# Patient Record
Sex: FEMALE | Race: WHITE | NOT HISPANIC OR LATINO | Employment: OTHER | URBAN - METROPOLITAN AREA
[De-identification: names, ages, dates, MRNs, and addresses within clinical notes are randomized per-mention and may not be internally consistent; named-entity substitution may affect disease eponyms.]

---

## 2018-03-27 DIAGNOSIS — J45.20 MILD INTERMITTENT EXTRINSIC ASTHMA: Primary | ICD-10-CM

## 2018-05-09 ENCOUNTER — OFFICE VISIT (OUTPATIENT)
Dept: NEUROLOGY | Facility: HOSPITAL | Age: 38
End: 2018-05-09
Attending: INTERNAL MEDICINE
Payer: COMMERCIAL

## 2018-05-09 VITALS
TEMPERATURE: 98 F | HEART RATE: 78 BPM | DIASTOLIC BLOOD PRESSURE: 77 MMHG | BODY MASS INDEX: 20.49 KG/M2 | SYSTOLIC BLOOD PRESSURE: 118 MMHG | WEIGHT: 138.31 LBS | HEIGHT: 69 IN

## 2018-05-09 DIAGNOSIS — K59.00 CONSTIPATION, UNSPECIFIED CONSTIPATION TYPE: Primary | ICD-10-CM

## 2018-05-09 PROCEDURE — 99213 OFFICE O/P EST LOW 20 MIN: CPT | Performed by: INTERNAL MEDICINE

## 2018-05-09 RX ORDER — ERGOCALCIFEROL 1.25 MG/1
CAPSULE ORAL
Refills: 3 | COMMUNITY
Start: 2018-03-24 | End: 2018-10-26

## 2018-05-09 RX ORDER — BUTALBITAL, ACETAMINOPHEN AND CAFFEINE 300; 40; 50 MG/1; MG/1; MG/1
CAPSULE ORAL
Refills: 3 | COMMUNITY
Start: 2018-04-10 | End: 2018-05-09 | Stop reason: SDUPTHER

## 2018-05-09 RX ORDER — BUTALBITAL, ACETAMINOPHEN AND CAFFEINE 50; 325; 40 MG/1; MG/1; MG/1
CAPSULE ORAL
Refills: 2 | COMMUNITY
Start: 2018-02-19 | End: 2018-10-26

## 2018-05-09 RX ORDER — ALBUTEROL SULFATE 0.83 MG/ML
SOLUTION RESPIRATORY (INHALATION)
Refills: 6 | COMMUNITY
Start: 2018-03-26 | End: 2018-10-26

## 2018-05-09 NOTE — PROGRESS NOTES
[facility]    CC: constipation    HPI 38 y.o. female with history of migraine and tension headaches who presents with complaints of over a decade of chronic, persistent, constipation associated with bloating without alarm features of rectal bleeding or weight loss. She states that she the symptoms of constipation have been ongoing even before she gave birth to her children starting at age 32. She has never had colonoscopy. She does take senna nightly to have a bowel movement every day. If she does not have a bowel movement then she suffers from bloating. She does run marathons, but does not endorse drinking protein shakes or whey protein. She has changed her diet to mainly plant based foods which has helped with bloating. Denies rectal bleeding. She has bowel movements daily, with the use of senna and stimulant laxatives like dulcolax. Miralax has not helped in the past. She has not tried prescription medications recently.     PMH  Migraine and tension headaches    PSH  Right wrise ORIF after fall    Social History  No smoking or drug use  Social alcohol use    FH  Grandfather with colon cancer age 60  Aunt with breast cancer  No first degree family members with colon cancer    Review of Systems  General ROS: negative for chills, fever or weight loss  Psychological ROS: negative for hallucination, depression or suicidal ideation  Ophthalmic ROS: negative for blurry vision, photophobia or eye pain  ENT ROS: negative for epistaxis, sore throat or rhinorrhea  Respiratory ROS: no cough, shortness of breath, or wheezing  Cardiovascular ROS: no chest pain or dyspnea on exertion  Gastrointestinal ROS: +bloating no abdominal pain, change in bowel habits, or black/ bloody stools  Genito-Urinary ROS: no dysuria, trouble voiding, or hematuria  Musculoskeletal ROS: negative for gait disturbance or muscular weakness  Neurological ROS: no syncope or seizures; no ataxia  Dermatological ROS: negative for pruritis, rash  "and jaundice    Physical Examination  /77   Pulse 78   Temp 97.7 °F (36.5 °C) (Oral)   Ht 5' 9" (1.753 m)   Wt 62.8 kg (138 lb 5.4 oz)   BMI 20.43 kg/m²   General appearance: alert, cooperative, no distress  HENT: Normocephalic, atraumatic, neck symmetrical, no nasal discharge   Eyes: conjunctivae/corneas clear, PERRL, EOM's intact  Lungs: clear to auscultation bilaterally, no dullness to percussion bilaterally  Heart: regular rate and rhythm without rub; no displacement of the PMI   Abdomen: soft, non-tender; bowel sounds normoactive; no organomegaly  Extremities: extremities symmetric; no clubbing, cyanosis, or edema  Integument: Skin color, texture, turgor normal; no rashes; hair distrubution normal  Neurologic: Alert and oriented X 3, normal strength, normal coordination and gait  Psychiatric: no pressured speech; normal affect; no evidence of impaired cognition     Assessment:   History of chronic constipation with bloating despite use of over the counter stimulant laxatives and miralax. One of her primary concerns is protuberance of her abdomen at night and when wearing a swim suit. This symptom of bloating/protuberance is worse since having her third child ~18 months ago. She reports her stomach is more flat when she is fasting. She has some improvement in her bloating after a BM. She consumes primarily a vegetarian diet but does not inject unabsorbed sugars or fiber supplements. She has been tested for celiac disease by serology but this was on a gluten free diet due to gluten intolerance. 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

## 2018-05-18 ENCOUNTER — TELEPHONE (OUTPATIENT)
Dept: NEUROLOGY | Facility: HOSPITAL | Age: 38
End: 2018-05-18

## 2018-05-18 NOTE — TELEPHONE ENCOUNTER
----- Message from Sarai English sent at 5/18/2018  8:38 AM CDT -----  Contact: Patient  GI- Patient was instructed to call and give an update of her condition. Patient states the constipation is getting a little better as of a couple of days ago. Patient still has very bad bloating. She feels something is not right. Abdomen is very distended. Peppermint is giving her a little reflux. Patient has not started on the antibiotic yet. Patient wants to know if she should go to her gynocologist.  Call back patient at  [phone]

## 2018-05-18 NOTE — TELEPHONE ENCOUNTER
Pt taking Linzess since 5/9/18, started IBGard on 5/16/18.  Pt having diarrhea now with bloating.  Bloating for about 9 months.  Pt is having regular periods.  Pt would like to know what is the next step.  Pt to be notified with Dr. Marquez recommendations.

## 2018-05-21 ENCOUNTER — TELEPHONE (OUTPATIENT)
Dept: NEUROLOGY | Facility: HOSPITAL | Age: 38
End: 2018-05-21

## 2018-05-21 NOTE — TELEPHONE ENCOUNTER
Returned pt's call, pt spoke to Dr. Marquez, this am.  Linzess and Xifaxan to be sent to pharmacy by Dr. Marquez.

## 2018-05-21 NOTE — PROGRESS NOTES
Called patient to review symptoms. She is having one BM a day on Linzess and this may be an alternative to Senna. Her bloating is not significantly improved with Linzess or IBGard.   Recommend:   Continue Linzess 290mcg daily as an alternative to routine Senna   Stop IBgard and proceed with trial of xifaxan   Follow up in clinic if no improvement with xifaxan   Prescription for Linzess sent to pharmacy

## 2018-05-24 ENCOUNTER — TELEPHONE (OUTPATIENT)
Dept: NEUROLOGY | Facility: HOSPITAL | Age: 38
End: 2018-05-24

## 2018-05-24 NOTE — TELEPHONE ENCOUNTER
----- Message from Sarai English sent at 5/24/2018 10:17 AM CDT -----  Contact: Patient  GI- Patient needs to speak to a nurse to give report about medications she has been taking and how she has been doing.  Call back [phone]

## 2018-05-24 NOTE — TELEPHONE ENCOUNTER
Pt continues with bloating, discomfort, bowel movements only with use of Glycerin suppository.  Pt wants to know when should she notice a change.  Pt to be notified with Dr. Marquez recommendations.

## 2018-05-25 NOTE — TELEPHONE ENCOUNTER
Pt instructed to complete 10 day trial of Xifaxan and continue with Linzess.  Pt to come back to clinic if not better in a couple of weeks.  Pt going out of town in a week, states she will call clinic or PCP for appt before going out of town.

## 2018-10-26 ENCOUNTER — OFFICE VISIT (OUTPATIENT)
Dept: OBSTETRICS AND GYNECOLOGY | Facility: CLINIC | Age: 38
End: 2018-10-26
Attending: OBSTETRICS & GYNECOLOGY
Payer: COMMERCIAL

## 2018-10-26 VITALS — HEIGHT: 69 IN | WEIGHT: 138.88 LBS | BODY MASS INDEX: 20.57 KG/M2

## 2018-10-26 DIAGNOSIS — N92.0 MENORRHAGIA WITH REGULAR CYCLE: Primary | ICD-10-CM

## 2018-10-26 PROCEDURE — 99999 PR PBB SHADOW E&M-EST. PATIENT-LVL III: CPT | Mod: PBBFAC,,, | Performed by: OBSTETRICS & GYNECOLOGY

## 2018-10-26 PROCEDURE — 99203 OFFICE O/P NEW LOW 30 MIN: CPT | Mod: S$GLB,,, | Performed by: OBSTETRICS & GYNECOLOGY

## 2018-10-26 RX ORDER — BUTALBITAL, ACETAMINOPHEN AND CAFFEINE 300; 40; 50 MG/1; MG/1; MG/1
CAPSULE ORAL
Refills: 1 | COMMUNITY
Start: 2018-10-16

## 2018-10-26 RX ORDER — SPIRONOLACTONE 50 MG/1
TABLET, FILM COATED ORAL
Refills: 0 | COMMUNITY
Start: 2018-08-17 | End: 2019-09-09

## 2018-10-26 NOTE — PROGRESS NOTES
"CC: establish care/heavy menses    Glo Aleman is a 38 y.o. female  moved to [facility] one year ago needs gyn.  She is not established.  Last annual/pap was 2017 and declines annual today.      Previous cycles q28 days back in , since having children hasn't had many cycles due to breastfeeding. Stopped breastfeeding this past 2018 and since then her cycles have been heavy and frequent.    Cycles q 18 days/7 days  Heavy, tampons and pads, 8 hours in changes q 1-2 hours.  3 days very heavy.  Had pcp was anemic, now on iron and not anemic, had tsh checked and declines meds today.  Sex once a month, coitus interruptus.  Marriage is strained.  LMP Oct 16.      Past Medical History:   Diagnosis Date    Abnormal Pap smear of cervix     hpv    Asthma     IBS (irritable bowel syndrome)     Migraine        Past Surgical History:   Procedure Laterality Date    WRIST SURGERY  2017    right wrist       OB History    Para Term  AB Living   3 3 3     3   SAB TAB Ectopic Multiple Live Births           3      # Outcome Date GA Lbr Cyril/2nd Weight Sex Delivery Anes PTL Lv   3 Term  41w0d  3.629 kg (8 lb) M Vag-Spont   VINICIUS   2 Term  39w0d  3.175 kg (7 lb) F Vag-Spont   VINICIUS   1 Term  39w0d  3.147 kg (6 lb 15 oz) F Vag-Spont   VINICIUS          Family History   Problem Relation Age of Onset    Alzheimer's disease Father     Hypertension Father     Asthma Mother     Breast cancer Maternal Aunt        Social History     Tobacco Use    Smoking status: Never Smoker    Smokeless tobacco: Never Used   Substance Use Topics    Alcohol use: No     Frequency: Never    Drug use: No       Ht 5' 9" (1.753 m)   Wt 63 kg (138 lb 14.2 oz)   LMP 10/16/2018   BMI 20.51 kg/m²     ROS:  GENERAL: Denies weight gain or weight loss. Feeling well overall.   SKIN: Denies rash or lesions.   HEAD: Denies head injury or headache.   NODES: Denies enlarged lymph nodes.   CHEST: Denies chest pain or shortness of " breath.   CARDIOVASCULAR: Denies palpitations or left sided chest pain.   ABDOMEN: No abdominal pain, constipation, diarrhea, nausea, vomiting or rectal bleeding.   URINARY: No frequency, dysuria, hematuria, or burning on urination.  REPRODUCTIVE: See HPI.   BREASTS: denies pain, lumps, or nipple discharge.   HEMATOLOGIC: No easy bruisability or excessive bleeding.  MUSCULOSKELETAL: Denies joint pain or swelling.   NEUROLOGIC: Denies syncope or weakness.   PSYCHIATRIC: Denies depression, anxiety or mood swings.    Physical Exam:    APPEARANCE: Well nourished, well developed, in no acute distress.  AFFECT: WNL, alert and oriented x 3  SKIN: No acne or hirsutism  NECK: Neck symmetric without masses or thyromegaly  NODES: No inguinal, cervical, axillary, or femoral lymph node enlargement  CHEST: Good respiratory effect  ABDOMEN: Soft.  No tenderness or masses.  No hepatosplenomegaly.  No hernias.  EXTREMITIES: No edema.    ASSESSMENT AND PLAN    Glo was seen today for new pt..    Diagnoses and all orders for this visit:    Menorrhagia with regular cycle    Other orders  -     levonorgestrel (MIRENA) 20 mcg/24 hr (5 years) IUD; 1 Intra Uterine Device by Intrauterine route once. for 1 dose    Detailed discussed on tx options with patient.  Does not want ocps.  Lysteda, prometrium nightly or cyclic or mirena IUD.  She knows she should not get pregnant on aldactone and I recommend more reliable contraception.  Mirena IUD is only local hormone effect, contraception and will best treat her bleeding.  She desires mirena.      Follow-up in about 4 weeks (around 11/23/2018).

## 2018-11-30 ENCOUNTER — TELEPHONE (OUTPATIENT)
Dept: PHARMACY | Facility: CLINIC | Age: 38
End: 2018-11-30

## 2018-11-30 NOTE — TELEPHONE ENCOUNTER
Contacted patient to notify her Mirena is covered on her pharmacy benefit with a $0.00 copay. She states she would like to wait until after the holidays to proceed. Will notify her provider and follow up after the first of the year.

## 2019-01-08 ENCOUNTER — TELEPHONE (OUTPATIENT)
Dept: PHARMACY | Facility: CLINIC | Age: 39
End: 2019-01-08

## 2019-07-09 ENCOUNTER — TELEPHONE (OUTPATIENT)
Dept: OBSTETRICS AND GYNECOLOGY | Facility: CLINIC | Age: 39
End: 2019-07-09

## 2019-07-09 NOTE — TELEPHONE ENCOUNTER
Spoke to pt. Pt was scheduled for an appt with Dr Osborne on Tuesday 09/10/19 at 1:15pm. Pt will call back when she finds out if her insurance is accepted.

## 2019-07-09 NOTE — TELEPHONE ENCOUNTER
----- Message from Marilee Dalton sent at 7/9/2019  9:41 AM CDT -----  Contact: self  Patient is calling to schedule a new patient appt for hormones. Patient can be reached at [phone].

## 2019-09-03 ENCOUNTER — TELEPHONE (OUTPATIENT)
Dept: OBSTETRICS AND GYNECOLOGY | Facility: CLINIC | Age: 39
End: 2019-09-03

## 2019-09-03 NOTE — TELEPHONE ENCOUNTER
Tanvir pt- saw Dr. Ramey in October of 2018. Has had irregular periods since the birth of her kids, and it was suggested at that visit that maybe Mirena would be a good option to think about.   Went to her PCP for annual visit and has not been feeling good. Reports hair loss (extreme), and being cold all the time.   Labs done at [email or web address] pt states  LH- norm  fsh-norm  Estradiol 29   Tsh- 4.14   She has faxed the labs over to "[employer] today and can pull up on phone. Wants to know if Mirena would be sufficient enough to raise that estrogen level?   Scheduled to discuss on 9/9

## 2019-09-03 NOTE — TELEPHONE ENCOUNTER
Wax pt - pt has previously discussed getting a mirena inserted with  at her last appt. She has recently found out that her estrogen level is low and would like to see if the mirena would still be a good option or if she should consider something else.

## 2019-09-09 ENCOUNTER — OFFICE VISIT (OUTPATIENT)
Dept: OBSTETRICS AND GYNECOLOGY | Facility: CLINIC | Age: 39
End: 2019-09-09
Attending: OBSTETRICS & GYNECOLOGY
Payer: COMMERCIAL

## 2019-09-09 VITALS
BODY MASS INDEX: 20.24 KG/M2 | WEIGHT: 136.69 LBS | HEIGHT: 69 IN | DIASTOLIC BLOOD PRESSURE: 60 MMHG | SYSTOLIC BLOOD PRESSURE: 100 MMHG

## 2019-09-09 DIAGNOSIS — N91.5 OLIGOMENORRHEA, UNSPECIFIED TYPE: Primary | ICD-10-CM

## 2019-09-09 DIAGNOSIS — N95.1 PERIMENOPAUSE: ICD-10-CM

## 2019-09-09 PROCEDURE — 99999 PR PBB SHADOW E&M-EST. PATIENT-LVL III: CPT | Mod: PBBFAC,,, | Performed by: OBSTETRICS & GYNECOLOGY

## 2019-09-09 PROCEDURE — 99213 OFFICE O/P EST LOW 20 MIN: CPT | Mod: S$GLB,,, | Performed by: OBSTETRICS & GYNECOLOGY

## 2019-09-09 PROCEDURE — 99213 PR OFFICE/OUTPT VISIT, EST, LEVL III, 20-29 MIN: ICD-10-PCS | Mod: S$GLB,,, | Performed by: OBSTETRICS & GYNECOLOGY

## 2019-09-09 PROCEDURE — 99999 PR PBB SHADOW E&M-EST. PATIENT-LVL III: ICD-10-PCS | Mod: PBBFAC,,, | Performed by: OBSTETRICS & GYNECOLOGY

## 2019-09-09 RX ORDER — PROGESTERONE 100 MG/1
100 CAPSULE ORAL NIGHTLY
Qty: 90 CAPSULE | Refills: 3 | Status: SHIPPED | OUTPATIENT
Start: 2019-09-09 | End: 2020-09-01

## 2019-09-09 RX ORDER — ALBUTEROL SULFATE 0.83 MG/ML
2.5 SOLUTION RESPIRATORY (INHALATION)
COMMUNITY
Start: 2018-03-26 | End: 2022-06-01

## 2019-09-09 RX ORDER — SPIRONOLACTONE 100 MG/1
TABLET, FILM COATED ORAL
Refills: 3 | COMMUNITY
Start: 2019-08-20

## 2019-09-09 RX ORDER — BUTALBITAL, ACETAMINOPHEN AND CAFFEINE 300; 40; 50 MG/1; MG/1; MG/1
50 CAPSULE ORAL
COMMUNITY
Start: 2018-04-10 | End: 2019-09-09

## 2019-09-09 RX ORDER — SPIRONOLACTONE 50 MG/1
50 TABLET, FILM COATED ORAL
COMMUNITY
Start: 2018-05-15 | End: 2019-09-09

## 2019-09-09 RX ORDER — ERGOCALCIFEROL 1.25 MG/1
50000 CAPSULE ORAL
COMMUNITY
Start: 2018-03-24 | End: 2022-05-16

## 2019-09-09 NOTE — PROGRESS NOTES
CC:  Skipping her cycles    Glo Aleman is a 39 y.o. female  I last saw the patient in 2018.  Back then she was having poly menorrhea with cycles every 18 days she also needed contraception and we had discussed Mirena IUD.  She never followed up and decided against the Mirena IUD.  She feels like she is having hormone problems and took a cash pay hormone tests that told her she had low estradiol.  She also had outside labs drawn 1 week after the start of her.    Her cycles this past year have been very spaced.  She has had 4 periods this year.  Lasting 5-7 days.  Some more heavy summer not.  She denies hot flashes.  She has some vaginal dryness.  She is extremely cold and sleeves with a heated blanket.  She reports hair loss.  She is currently sexually active with her  but is not in a good relationship and it is infrequent.  These coitus interruptus for contraception.  Her last period was 2 months ago.  She took a pregnancy test 1 month ago and it was negative. She declines a pregnancy test today.    TSH was 4.14 last year was 2.02.  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    Took everyly well test on was told low estradiol.      Past Medical History:   Diagnosis Date    Abnormal Pap smear of cervix     hpv    Asthma     IBS (irritable bowel syndrome)     Migraine        Past Surgical History:   Procedure Laterality Date    WRIST SURGERY  2017    right wrist       OB History    Para Term  AB Living   3 3 3     3   SAB TAB Ectopic Multiple Live Births           3      # Outcome Date GA Lbr Cyril/2nd Weight Sex Delivery Anes PTL Lv   3 Term  41w0d  3.629 kg (8 lb) M Vag-Spont   VINICIUS   2 Term  39w0d  3.175 kg (7 lb) F Vag-Spont   VINICIUS   1 Term  39w0d  3.147 kg (6 lb 15 oz) F Vag-Spont   VINICIUS       Family History   Problem Relation Age of Onset    Alzheimer's disease Father     Hypertension Father      "Asthma Mother     Breast cancer Maternal Aunt        Social History     Tobacco Use    Smoking status: Never Smoker    Smokeless tobacco: Never Used   Substance Use Topics    Alcohol use: No     Frequency: Never    Drug use: No       /60   Ht 5' 9" (1.753 m)   Wt 62 kg (136 lb 11 oz)   LMP 08/17/2019   BMI 20.18 kg/m²     ROS:  GENERAL: Denies weight gain or weight loss. Feeling well overall.   SKIN: Denies rash or lesions.   HEAD: Denies head injury or headache.   NODES: Denies enlarged lymph nodes.   CHEST: Denies chest pain or shortness of breath.   CARDIOVASCULAR: Denies palpitations or left sided chest pain.   ABDOMEN: No abdominal pain, constipation, diarrhea, nausea, vomiting or rectal bleeding.   URINARY: No frequency, dysuria, hematuria, or burning on urination.  REPRODUCTIVE: See HPI.   BREASTS: denies pain, lumps, or nipple discharge.   HEMATOLOGIC: No easy bruisability or excessive bleeding.  MUSCULOSKELETAL: Denies joint pain or swelling.   NEUROLOGIC: Denies syncope or weakness.   PSYCHIATRIC: Denies depression, anxiety or mood swings.    Physical Exam:    APPEARANCE: Well nourished, well developed, in no acute distress.  AFFECT: WNL, alert and oriented x 3  SKIN: No acne or hirsutism  NECK: Neck symmetric without masses or thyromegaly  NODES: No inguinal, cervical, axillary, or femoral lymph node enlargement  CHEST: Good respiratory effect  ABDOMEN: Soft.  No tenderness or masses.  No hepatosplenomegaly.  No hernias.  EXTREMITIES: No edema.    ASSESSMENT AND PLAN    Glo was seen today for consult.    Diagnoses and all orders for this visit:    Oligomenorrhea, unspecified type    Perimenopause  -     progesterone (PROMETRIUM) 100 MG capsule; Take 1 capsule (100 mg total) by mouth nightly. Increase to 200mg nightly after one week     Discussed with patient her labs are not conclusive for menopause however she could be perimenopausal.  I would draw an additional free T3 and free T4 but " I would also get an AMH level is that might give us more information.  I discussed with her not supplementing with estradiol as she is not menopausal.  Given her estradiol will only worsen her symptoms.  I recommend we start progesterone only.  We discussed about the side effects.  I also want her to see a hair loss specialist Dr. Rosey Patel.  Patient is going to call me to tell me where she can get her labs drawn.    Follow-up in 4-6 weeks

## 2019-09-10 ENCOUNTER — TELEPHONE (OUTPATIENT)
Dept: OBSTETRICS AND GYNECOLOGY | Facility: CLINIC | Age: 39
End: 2019-09-10

## 2019-09-10 DIAGNOSIS — N95.1 PERIMENOPAUSE: ICD-10-CM

## 2019-09-10 DIAGNOSIS — R53.83 FATIGUE, UNSPECIFIED TYPE: Primary | ICD-10-CM

## 2019-09-10 NOTE — TELEPHONE ENCOUNTER
Please review message, pt. Did not sleep well last night, asking if it could have been the medication you prescribed.    Pt. Had labs at [facility] or did you want to order labs for her to have a [facility]  (?)

## 2019-09-10 NOTE — TELEPHONE ENCOUNTER
Patient saw  yesterday and said she can her labs done at [facility] and she took her first progesterone last night and she didn't sleep at all last night, would the pill have prevented her from sleeping already?

## 2019-09-10 NOTE — TELEPHONE ENCOUNTER
Yes get AMH, free T3 and free T4.  Dx is fatigue and perimenopause.  Tell her to try meds again tonight if doesn't like it then stop

## 2019-10-21 LAB
MIS SERPL-MCNC: 0.05 NG/ML
T3FREE SERPL-MCNC: 3.1 PG/ML (ref 2–4.4)
T4 FREE SERPL-MCNC: 1.08 NG/DL (ref 0.82–1.77)

## 2019-10-22 DIAGNOSIS — E07.9 THYROID DISEASE: Primary | ICD-10-CM

## 2019-10-22 RX ORDER — THYROID 30 MG/1
30 TABLET ORAL
Qty: 30 TABLET | Refills: 11 | Status: SHIPPED | OUTPATIENT
Start: 2019-10-22 | End: 2020-11-16

## 2020-05-26 ENCOUNTER — TELEPHONE (OUTPATIENT)
Dept: OBSTETRICS AND GYNECOLOGY | Facility: CLINIC | Age: 40
End: 2020-05-26

## 2020-05-26 DIAGNOSIS — N92.6 ABNORMAL MENSTRUAL PERIODS: Primary | ICD-10-CM

## 2020-05-27 NOTE — TELEPHONE ENCOUNTER
Pt states she has had irregular periods for about 2 years. At first she was having long cycles but recently started having a period every 2 weeks and has back discomfort/pain.  She also reports hair loss.  Scheduled US and appt with Dr. Ramey 6/17.  Aware of US prep

## 2020-09-01 ENCOUNTER — OFFICE VISIT (OUTPATIENT)
Dept: OBSTETRICS AND GYNECOLOGY | Facility: CLINIC | Age: 40
End: 2020-09-01
Attending: OBSTETRICS & GYNECOLOGY
Payer: COMMERCIAL

## 2020-09-01 ENCOUNTER — LAB VISIT (OUTPATIENT)
Dept: LAB | Facility: HOSPITAL | Age: 40
End: 2020-09-01
Attending: OBSTETRICS & GYNECOLOGY
Payer: COMMERCIAL

## 2020-09-01 VITALS
DIASTOLIC BLOOD PRESSURE: 60 MMHG | SYSTOLIC BLOOD PRESSURE: 100 MMHG | HEIGHT: 69 IN | BODY MASS INDEX: 20.11 KG/M2 | WEIGHT: 135.81 LBS

## 2020-09-01 DIAGNOSIS — E07.9 THYROID DISEASE: ICD-10-CM

## 2020-09-01 DIAGNOSIS — N93.9 ABNORMAL UTERINE BLEEDING (AUB): ICD-10-CM

## 2020-09-01 DIAGNOSIS — Z12.4 PAP SMEAR FOR CERVICAL CANCER SCREENING: ICD-10-CM

## 2020-09-01 DIAGNOSIS — Z11.51 ENCOUNTER FOR SCREENING FOR HUMAN PAPILLOMAVIRUS (HPV): ICD-10-CM

## 2020-09-01 DIAGNOSIS — Z01.419 ENCOUNTER FOR GYNECOLOGICAL EXAMINATION: Primary | ICD-10-CM

## 2020-09-01 DIAGNOSIS — Z12.31 OTHER SCREENING MAMMOGRAM: ICD-10-CM

## 2020-09-01 PROCEDURE — 84439 ASSAY OF FREE THYROXINE: CPT

## 2020-09-01 PROCEDURE — 88175 CYTOPATH C/V AUTO FLUID REDO: CPT

## 2020-09-01 PROCEDURE — 99396 PR PREVENTIVE VISIT,EST,40-64: ICD-10-PCS | Mod: S$GLB,,, | Performed by: OBSTETRICS & GYNECOLOGY

## 2020-09-01 PROCEDURE — 99999 PR PBB SHADOW E&M-EST. PATIENT-LVL III: CPT | Mod: PBBFAC,,, | Performed by: OBSTETRICS & GYNECOLOGY

## 2020-09-01 PROCEDURE — 99396 PREV VISIT EST AGE 40-64: CPT | Mod: S$GLB,,, | Performed by: OBSTETRICS & GYNECOLOGY

## 2020-09-01 PROCEDURE — 84443 ASSAY THYROID STIM HORMONE: CPT

## 2020-09-01 PROCEDURE — 87624 HPV HI-RISK TYP POOLED RSLT: CPT

## 2020-09-01 PROCEDURE — 84481 FREE ASSAY (FT-3): CPT

## 2020-09-01 PROCEDURE — 99999 PR PBB SHADOW E&M-EST. PATIENT-LVL III: ICD-10-PCS | Mod: PBBFAC,,, | Performed by: OBSTETRICS & GYNECOLOGY

## 2020-09-01 NOTE — PROGRESS NOTES
"CC: Well woman exam    Glo Aleman is a 40 y.o. female  presents for a well woman exam.      Has h/o oligomenorrhea, but this past year bleeding eery 2 weeks and now LMP July.      Past Medical History:   Diagnosis Date    Abnormal Pap smear of cervix     hpv    Asthma     IBS (irritable bowel syndrome)     Migraine        Past Surgical History:   Procedure Laterality Date    WRIST SURGERY  2017    right wrist       OB History    Para Term  AB Living   3 3 3     3   SAB TAB Ectopic Multiple Live Births           3      # Outcome Date GA Lbr Cyril/2nd Weight Sex Delivery Anes PTL Lv   3 Term  41w0d  3.629 kg (8 lb) M Vag-Spont   VINICIUS   2 Term  39w0d  3.175 kg (7 lb) F Vag-Spont   VINICIUS   1 Term  39w0d  3.147 kg (6 lb 15 oz) F Vag-Spont   VINICIUS       Family History   Problem Relation Age of Onset    Alzheimer's disease Father     Hypertension Father     Asthma Mother     Breast cancer Maternal Aunt        Social History     Tobacco Use    Smoking status: Never Smoker    Smokeless tobacco: Never Used   Substance Use Topics    Alcohol use: No     Frequency: Never    Drug use: No       /60   Ht 5' 9" (1.753 m)   Wt 61.6 kg (135 lb 12.9 oz)   LMP 2020   BMI 20.05 kg/m²     ROS:  GENERAL: Denies weight gain or weight loss. Feeling well overall.   SKIN: Denies rash or lesions.   HEAD: Denies head injury or headache.   NODES: Denies enlarged lymph nodes.   CHEST: Denies chest pain or shortness of breath.   CARDIOVASCULAR: Denies palpitations or left sided chest pain.   ABDOMEN: No abdominal pain, constipation, diarrhea, nausea, vomiting or rectal bleeding.   URINARY: No frequency, dysuria, hematuria, or burning on urination.  REPRODUCTIVE: See HPI.   BREASTS: The patient performs breast self-examination and denies pain, lumps, or nipple discharge.   HEMATOLOGIC: No easy bruisability or excessive bleeding.  MUSCULOSKELETAL: Denies joint pain or swelling. "   NEUROLOGIC: Denies syncope or weakness.   PSYCHIATRIC: Denies depression, anxiety or mood swings.    Physical Exam:    APPEARANCE: Well nourished, well developed, in no acute distress.  AFFECT: WNL, alert and oriented x 3  SKIN: No acne or hirsutism  NECK: Neck symmetric without masses or thyromegaly  NODES: No inguinal, cervical, axillary, or femoral lymph node enlargement  CHEST: Good respiratory effect  ABDOMEN: Soft.  No tenderness or masses.  No hepatosplenomegaly.  No hernias.  BREASTS: Symmetrical, no skin changes or visible lesions.  No palpable masses, nipple discharge bilaterally.  PELVIC: Normal external genitalia without lesions.  Normal hair distribution.  Adequate perineal body, normal urethral meatus.  Vagina moist and well rugated without lesions or discharge.  Cervix pink, without lesions, discharge or tenderness.  No significant cystocele or rectocele.  Bimanual exam shows uterus to be normal size, regular, mobile and nontender.  Adnexa without masses or tenderness.    EXTREMITIES: No edema.    ASSESSMENT AND PLAN  1. Encounter for gynecological examination     2. Encounter for screening for human papillomavirus (HPV)  HPV High Risk Genotypes, PCR   3. Pap smear for cervical cancer screening  Liquid-Based Pap Smear, Screening   4. Other screening mammogram  Mammo Digital Screening Bilat w/ Jonathan   5. Thyroid disease  TSH    T3, free    T4, free   6. Abnormal uterine bleeding (AUB)  US Pelvis Comp with Transvag NON-OB (xpd       Patient was counseled today on A.C.S. Pap guidelines and recommendations for yearly pelvic exams, mammograms and monthly self breast exams; to see her PCP for other health maintenance.     Follow up in about 1 year (around 9/1/2021).

## 2020-09-02 LAB
T3FREE SERPL-MCNC: 2.7 PG/ML (ref 2.3–4.2)
T4 FREE SERPL-MCNC: 0.79 NG/DL (ref 0.71–1.51)
TSH SERPL DL<=0.005 MIU/L-ACNC: 1.88 UIU/ML (ref 0.4–4)

## 2020-09-04 ENCOUNTER — TELEPHONE (OUTPATIENT)
Dept: OBSTETRICS AND GYNECOLOGY | Facility: CLINIC | Age: 40
End: 2020-09-04

## 2020-09-04 NOTE — TELEPHONE ENCOUNTER
----- Message from Nabeel Ramey MD sent at 9/4/2020  1:28 PM CDT -----  Make sure patient gets my portal message and if not active in the portal call patient and relay my message.    Good News! All of your blood work came back normal. Please call the office if you have any questions.   Sincerely,  Dr. Ramey

## 2020-09-10 LAB
HPV HR 12 DNA SPEC QL NAA+PROBE: NEGATIVE
HPV16 AG SPEC QL: NEGATIVE
HPV18 DNA SPEC QL NAA+PROBE: NEGATIVE

## 2020-09-18 LAB
FINAL PATHOLOGIC DIAGNOSIS: NORMAL
Lab: NORMAL

## 2020-10-02 ENCOUNTER — OFFICE VISIT (OUTPATIENT)
Dept: OBSTETRICS AND GYNECOLOGY | Facility: CLINIC | Age: 40
End: 2020-10-02
Attending: OBSTETRICS & GYNECOLOGY
Payer: COMMERCIAL

## 2020-10-02 VITALS
DIASTOLIC BLOOD PRESSURE: 60 MMHG | SYSTOLIC BLOOD PRESSURE: 100 MMHG | BODY MASS INDEX: 20 KG/M2 | WEIGHT: 135.06 LBS | HEIGHT: 69 IN

## 2020-10-02 DIAGNOSIS — N91.2 AMENORRHEA: Primary | ICD-10-CM

## 2020-10-02 PROCEDURE — 99213 OFFICE O/P EST LOW 20 MIN: CPT | Mod: S$GLB,,, | Performed by: OBSTETRICS & GYNECOLOGY

## 2020-10-02 PROCEDURE — 99213 PR OFFICE/OUTPT VISIT, EST, LEVL III, 20-29 MIN: ICD-10-PCS | Mod: S$GLB,,, | Performed by: OBSTETRICS & GYNECOLOGY

## 2020-10-02 PROCEDURE — 99999 PR PBB SHADOW E&M-EST. PATIENT-LVL III: ICD-10-PCS | Mod: PBBFAC,,, | Performed by: OBSTETRICS & GYNECOLOGY

## 2020-10-02 PROCEDURE — 99999 PR PBB SHADOW E&M-EST. PATIENT-LVL III: CPT | Mod: PBBFAC,,, | Performed by: OBSTETRICS & GYNECOLOGY

## 2020-10-02 NOTE — PROGRESS NOTES
"CC: no cycles since July    Glo Aleman is a 40 y.o. female      LMP .  prior was cycling every  2 weeks.  Her mother went through menopause in early 40s.  Sexually active with  but infrequent.  No hot flashes no vaginal dryness.  Does not want intervention. Wants to make sure everything is okay.     Past Medical History:   Diagnosis Date    Abnormal Pap smear of cervix     hpv    Asthma     IBS (irritable bowel syndrome)     Migraine        Past Surgical History:   Procedure Laterality Date    WRIST SURGERY  2017    right wrist       OB History    Para Term  AB Living   3 3 3     3   SAB TAB Ectopic Multiple Live Births           3      # Outcome Date GA Lbr Cyril/2nd Weight Sex Delivery Anes PTL Lv   3 Term  41w0d  3.629 kg (8 lb) M Vag-Spont   VINICIUS   2 Term  39w0d  3.175 kg (7 lb) F Vag-Spont   VINICIUS   1 Term  39w0d  3.147 kg (6 lb 15 oz) F Vag-Spont   VINICIUS       Family History   Problem Relation Age of Onset    Alzheimer's disease Father     Hypertension Father     Asthma Mother     Breast cancer Maternal Aunt        Social History     Tobacco Use    Smoking status: Never Smoker    Smokeless tobacco: Never Used   Substance Use Topics    Alcohol use: No     Frequency: Never    Drug use: No       /60   Ht 5' 9" (1.753 m)   Wt 61.3 kg (135 lb 0.5 oz)   LMP 2020   BMI 19.94 kg/m²     ROS:  GENERAL: Denies weight gain or weight loss. Feeling well overall.   SKIN: Denies rash or lesions.   HEAD: Denies head injury or headache.   NODES: Denies enlarged lymph nodes.   CHEST: Denies chest pain or shortness of breath.   CARDIOVASCULAR: Denies palpitations or left sided chest pain.   ABDOMEN: No abdominal pain, constipation, diarrhea, nausea, vomiting or rectal bleeding.   URINARY: No frequency, dysuria, hematuria, or burning on urination.  REPRODUCTIVE: See HPI.   BREASTS: denies pain, lumps, or nipple discharge.   HEMATOLOGIC: No easy " bruisability or excessive bleeding.  MUSCULOSKELETAL: Denies joint pain or swelling.   NEUROLOGIC: Denies syncope or weakness.   PSYCHIATRIC: Denies depression, anxiety or mood swings.    Physical Exam:    APPEARANCE: Well nourished, well developed, in no acute distress.  AFFECT: WNL, alert and oriented x 3  SKIN: No acne or hirsutism  NECK: Neck symmetric without masses or thyromegaly  NODES: No inguinal, cervical, axillary, or femoral lymph node enlargement  CHEST: Good respiratory effect  ABDOMEN: Soft.  No tenderness or masses.  No hepatosplenomegaly.  No hernias.   EXTREMITIES: No edema.    ASSESSMENT AND PLAN    Glo was seen today for gynecologic exam.    Diagnoses and all orders for this visit:    Amenorrhea    discussed u/s normal. ES 5mm reassurance.  Suspect perimenopause bleed precautions given.  Pt left before doing upt.      Follow up if symptoms worsen or fail to improve.

## 2020-10-07 ENCOUNTER — APPOINTMENT (OUTPATIENT)
Dept: RADIOLOGY | Facility: OTHER | Age: 40
End: 2020-10-07
Attending: OBSTETRICS & GYNECOLOGY
Payer: COMMERCIAL

## 2020-10-07 VITALS — BODY MASS INDEX: 20.01 KG/M2 | WEIGHT: 135.13 LBS | HEIGHT: 69 IN

## 2020-10-07 DIAGNOSIS — Z12.31 OTHER SCREENING MAMMOGRAM: ICD-10-CM

## 2020-10-07 PROCEDURE — 77067 SCR MAMMO BI INCL CAD: CPT | Mod: 26,,, | Performed by: RADIOLOGY

## 2020-10-07 PROCEDURE — 77067 SCR MAMMO BI INCL CAD: CPT | Mod: TC,PN

## 2020-10-07 PROCEDURE — 77067 MAMMO DIGITAL SCREENING BILAT WITH TOMOSYNTHESIS_CAD: ICD-10-PCS | Mod: 26,,, | Performed by: RADIOLOGY

## 2020-10-07 PROCEDURE — 77063 BREAST TOMOSYNTHESIS BI: CPT | Mod: 26,,, | Performed by: RADIOLOGY

## 2020-10-07 PROCEDURE — 77063 MAMMO DIGITAL SCREENING BILAT WITH TOMOSYNTHESIS_CAD: ICD-10-PCS | Mod: 26,,, | Performed by: RADIOLOGY

## 2020-10-13 ENCOUNTER — TELEPHONE (OUTPATIENT)
Dept: OBSTETRICS AND GYNECOLOGY | Facility: CLINIC | Age: 40
End: 2020-10-13

## 2020-10-13 NOTE — TELEPHONE ENCOUNTER
----- Message from Nabeel Ramey MD sent at 10/9/2020 12:23 PM CDT -----  Make sure pt gets my message if not call pt:    Your mammogram came back normal!  However due to your risk factors, your lifetime percent chance of getting breast cancer is higher than 20% and the radiologist is recommending increased screening with annual breast MRI.  Please check with your insurance to see if this is a viable option for you.  Also please do not overly stress about this.  It does NOT mean you will get breast cancer.  You are just slightly at higher risk than the rest of the population and could benefit from increased screening.   Please do not hesitate to call this office if you have any questions.  Dr. Ramey

## 2020-10-13 NOTE — TELEPHONE ENCOUNTER
Spoke with pt. And told her about recomemmnding a breast MRI if she would like to have done, and she is wanting to wait for her yearly mammogram.

## 2021-04-16 ENCOUNTER — PATIENT MESSAGE (OUTPATIENT)
Dept: RESEARCH | Facility: HOSPITAL | Age: 41
End: 2021-04-16

## 2021-09-13 DIAGNOSIS — E07.9 THYROID DISEASE: ICD-10-CM

## 2021-09-13 RX ORDER — THYROID 30 MG/1
30 TABLET ORAL
Qty: 90 TABLET | Refills: 1 | Status: SHIPPED | OUTPATIENT
Start: 2021-09-13 | End: 2021-11-16 | Stop reason: SDUPTHER

## 2021-11-16 DIAGNOSIS — E07.9 THYROID DISEASE: ICD-10-CM

## 2021-11-17 RX ORDER — THYROID 30 MG/1
30 TABLET ORAL
Qty: 90 TABLET | Refills: 1 | Status: SHIPPED | OUTPATIENT
Start: 2021-11-17 | End: 2022-05-17 | Stop reason: SDUPTHER

## 2022-03-04 ENCOUNTER — HOSPITAL ENCOUNTER (OUTPATIENT)
Dept: RADIOLOGY | Facility: HOSPITAL | Age: 42
Discharge: HOME | End: 2022-03-04
Attending: INTERNAL MEDICINE
Payer: COMMERCIAL

## 2022-03-04 DIAGNOSIS — M25.532 ARTHRALGIA OF LEFT WRIST: ICD-10-CM

## 2022-05-16 DIAGNOSIS — E07.9 THYROID DISEASE: ICD-10-CM

## 2022-05-16 NOTE — TELEPHONE ENCOUNTER
Patient transferring to [facility] and not me. Can you please send to her to decide if comfortable filling as not seen or had labs in well over a year.

## 2022-05-17 RX ORDER — THYROID 30 MG/1
30 TABLET ORAL
Qty: 30 TABLET | Refills: 0 | Status: SHIPPED | OUTPATIENT
Start: 2022-05-17 | End: 2022-07-19 | Stop reason: SDUPTHER

## 2022-05-17 RX ORDER — LEVOTHYROXINE, LIOTHYRONINE 19; 4.5 UG/1; UG/1
TABLET ORAL
Qty: 90 TABLET | Refills: 1 | OUTPATIENT
Start: 2022-05-17

## 2022-06-01 ENCOUNTER — OFFICE VISIT (OUTPATIENT)
Dept: OBSTETRICS AND GYNECOLOGY | Facility: CLINIC | Age: 42
End: 2022-06-01
Attending: OBSTETRICS & GYNECOLOGY
Payer: COMMERCIAL

## 2022-06-01 ENCOUNTER — LAB VISIT (OUTPATIENT)
Dept: LAB | Facility: HOSPITAL | Age: 42
End: 2022-06-01
Attending: OBSTETRICS & GYNECOLOGY
Payer: COMMERCIAL

## 2022-06-01 VITALS
DIASTOLIC BLOOD PRESSURE: 70 MMHG | HEIGHT: 69 IN | BODY MASS INDEX: 19.26 KG/M2 | WEIGHT: 130.06 LBS | SYSTOLIC BLOOD PRESSURE: 100 MMHG

## 2022-06-01 DIAGNOSIS — N91.2 AMENORRHEA: ICD-10-CM

## 2022-06-01 DIAGNOSIS — Z01.419 ENCOUNTER FOR GYNECOLOGICAL EXAMINATION (GENERAL) (ROUTINE) WITHOUT ABNORMAL FINDINGS: ICD-10-CM

## 2022-06-01 DIAGNOSIS — L65.9 HAIR LOSS: ICD-10-CM

## 2022-06-01 DIAGNOSIS — Z12.31 VISIT FOR SCREENING MAMMOGRAM: Primary | ICD-10-CM

## 2022-06-01 LAB
ALBUMIN SERPL BCP-MCNC: 4.3 G/DL (ref 3.5–5.2)
ALP SERPL-CCNC: 52 U/L (ref 55–135)
ALT SERPL W/O P-5'-P-CCNC: 18 U/L (ref 10–44)
ANION GAP SERPL CALC-SCNC: 9 MMOL/L (ref 8–16)
AST SERPL-CCNC: 24 U/L (ref 10–40)
BASOPHILS # BLD AUTO: 0.04 K/UL (ref 0–0.2)
BASOPHILS NFR BLD: 0.8 % (ref 0–1.9)
BILIRUB SERPL-MCNC: 0.3 MG/DL (ref 0.1–1)
BUN SERPL-MCNC: 15 MG/DL (ref 6–20)
CALCIUM SERPL-MCNC: 9 MG/DL (ref 8.7–10.5)
CHLORIDE SERPL-SCNC: 104 MMOL/L (ref 95–110)
CO2 SERPL-SCNC: 23 MMOL/L (ref 23–29)
CREAT SERPL-MCNC: 0.9 MG/DL (ref 0.5–1.4)
DIFFERENTIAL METHOD: ABNORMAL
EOSINOPHIL # BLD AUTO: 0 K/UL (ref 0–0.5)
EOSINOPHIL NFR BLD: 0.6 % (ref 0–8)
ERYTHROCYTE [DISTWIDTH] IN BLOOD BY AUTOMATED COUNT: 11.7 % (ref 11.5–14.5)
EST. GFR  (AFRICAN AMERICAN): >60 ML/MIN/1.73 M^2
EST. GFR  (NON AFRICAN AMERICAN): >60 ML/MIN/1.73 M^2
ESTRADIOL SERPL-MCNC: 43 PG/ML
FERRITIN SERPL-MCNC: 88 NG/ML (ref 20–300)
FSH SERPL-ACNC: 116.81 MIU/ML
GLUCOSE SERPL-MCNC: 108 MG/DL (ref 70–110)
HCG INTACT+B SERPL-ACNC: <2.4 MIU/ML
HCT VFR BLD AUTO: 37.3 % (ref 37–48.5)
HGB BLD-MCNC: 12.1 G/DL (ref 12–16)
IMM GRANULOCYTES # BLD AUTO: 0.01 K/UL (ref 0–0.04)
IMM GRANULOCYTES NFR BLD AUTO: 0.2 % (ref 0–0.5)
LYMPHOCYTES # BLD AUTO: 1.3 K/UL (ref 1–4.8)
LYMPHOCYTES NFR BLD: 26.5 % (ref 18–48)
MCH RBC QN AUTO: 30.8 PG (ref 27–31)
MCHC RBC AUTO-ENTMCNC: 32.4 G/DL (ref 32–36)
MCV RBC AUTO: 95 FL (ref 82–98)
MONOCYTES # BLD AUTO: 0.4 K/UL (ref 0.3–1)
MONOCYTES NFR BLD: 7.5 % (ref 4–15)
NEUTROPHILS # BLD AUTO: 3.3 K/UL (ref 1.8–7.7)
NEUTROPHILS NFR BLD: 64.4 % (ref 38–73)
NRBC BLD-RTO: 0 /100 WBC
PLATELET # BLD AUTO: 247 K/UL (ref 150–450)
PMV BLD AUTO: 11.5 FL (ref 9.2–12.9)
POTASSIUM SERPL-SCNC: 4.6 MMOL/L (ref 3.5–5.1)
PROLACTIN SERPL IA-MCNC: 27.2 NG/ML (ref 5.2–26.5)
PROT SERPL-MCNC: 6.8 G/DL (ref 6–8.4)
RBC # BLD AUTO: 3.93 M/UL (ref 4–5.4)
SODIUM SERPL-SCNC: 136 MMOL/L (ref 136–145)
T4 FREE SERPL-MCNC: 0.73 NG/DL (ref 0.71–1.51)
THYROPEROXIDASE IGG SERPL-ACNC: <6 IU/ML
TSH SERPL DL<=0.005 MIU/L-ACNC: 2.36 UIU/ML (ref 0.4–4)
WBC # BLD AUTO: 5.06 K/UL (ref 3.9–12.7)

## 2022-06-01 PROCEDURE — 84702 CHORIONIC GONADOTROPIN TEST: CPT | Performed by: OBSTETRICS & GYNECOLOGY

## 2022-06-01 PROCEDURE — 99396 PREV VISIT EST AGE 40-64: CPT | Mod: S$GLB,,, | Performed by: OBSTETRICS & GYNECOLOGY

## 2022-06-01 PROCEDURE — 99999 PR PBB SHADOW E&M-EST. PATIENT-LVL III: ICD-10-PCS | Mod: PBBFAC,,, | Performed by: OBSTETRICS & GYNECOLOGY

## 2022-06-01 PROCEDURE — 85025 COMPLETE CBC W/AUTO DIFF WBC: CPT | Performed by: OBSTETRICS & GYNECOLOGY

## 2022-06-01 PROCEDURE — 82670 ASSAY OF TOTAL ESTRADIOL: CPT | Performed by: OBSTETRICS & GYNECOLOGY

## 2022-06-01 PROCEDURE — 84146 ASSAY OF PROLACTIN: CPT | Performed by: OBSTETRICS & GYNECOLOGY

## 2022-06-01 PROCEDURE — 80053 COMPREHEN METABOLIC PANEL: CPT | Performed by: OBSTETRICS & GYNECOLOGY

## 2022-06-01 PROCEDURE — 99999 PR PBB SHADOW E&M-EST. PATIENT-LVL III: CPT | Mod: PBBFAC,,, | Performed by: OBSTETRICS & GYNECOLOGY

## 2022-06-01 PROCEDURE — 82728 ASSAY OF FERRITIN: CPT | Performed by: OBSTETRICS & GYNECOLOGY

## 2022-06-01 PROCEDURE — 99396 PR PREVENTIVE VISIT,EST,40-64: ICD-10-PCS | Mod: S$GLB,,, | Performed by: OBSTETRICS & GYNECOLOGY

## 2022-06-01 PROCEDURE — 84439 ASSAY OF FREE THYROXINE: CPT | Performed by: OBSTETRICS & GYNECOLOGY

## 2022-06-01 PROCEDURE — 86376 MICROSOMAL ANTIBODY EACH: CPT | Performed by: OBSTETRICS & GYNECOLOGY

## 2022-06-01 PROCEDURE — 83001 ASSAY OF GONADOTROPIN (FSH): CPT | Performed by: OBSTETRICS & GYNECOLOGY

## 2022-06-01 PROCEDURE — 84443 ASSAY THYROID STIM HORMONE: CPT | Performed by: OBSTETRICS & GYNECOLOGY

## 2022-06-01 RX ORDER — PROGESTERONE 100 MG/1
100 CAPSULE ORAL NIGHTLY
Qty: 30 CAPSULE | Refills: 11 | Status: SHIPPED | OUTPATIENT
Start: 2022-06-01 | End: 2023-06-01

## 2022-06-01 RX ORDER — SPIRONOLACTONE 50 MG/1
50 TABLET, FILM COATED ORAL DAILY
COMMUNITY
Start: 2022-04-06

## 2022-06-01 RX ORDER — MINOCYCLINE 15 MG/G
AEROSOL, FOAM TOPICAL
COMMUNITY

## 2022-06-01 NOTE — PROGRESS NOTES
Subjective:       Patient ID: Glo Aleman is a 42 y.o. female.    Chief Complaint:  Well Woman (2020-pap/hpv-negative/negative /10/07/77-jamca-llvhgg # 1)      History of Present Illness  42 year old here for annual.  Reports irregular periods for past year 2-3 periods.  No current vaginal bleeding or pelvic pain.  Reports hair loss and trying aldactone but has not seen improvement.  No breast concerns.  Discussed TC score.  Discussed labs today   Will start progesterone at bedtime       GYN & OB History  No LMP recorded.   Date of Last Pap: 2020    OB History    Para Term  AB Living   3 3 3     3   SAB IAB Ectopic Multiple Live Births           3      # Outcome Date GA Lbr Cyril/2nd Weight Sex Delivery Anes PTL Lv   3 Term  41w0d  3.629 kg (8 lb) M Vag-Spont   VINICIUS   2 Term  39w0d  3.175 kg (7 lb) F Vag-Spont   VINICIUS   1 Term  39w0d  3.147 kg (6 lb 15 oz) F Vag-Spont   VINICIUS       Past Medical History:   Diagnosis Date    Abnormal Pap smear of cervix     hpv    Amenorrhea     Since first period at 13    Asthma     IBS (irritable bowel syndrome)     Migraine        Past Surgical History:   Procedure Laterality Date    WRIST SURGERY  2017    right wrist       Review of Systems  Review of Systems   Constitutional: Negative for fatigue.   Respiratory: Negative for shortness of breath.    Cardiovascular: Negative for chest pain.   Gastrointestinal: Negative for abdominal pain, constipation, diarrhea and nausea.   Endocrine: Negative for heat intolerance.   Genitourinary: Positive for menstrual problem. Negative for dyspareunia, dysuria and vaginal bleeding.   Musculoskeletal: Negative for back pain.   Skin: Negative for rash.   Neurological: Negative for headaches.   Hematological: Negative for adenopathy.   Psychiatric/Behavioral: Negative for dysphoric mood. The patient is not nervous/anxious.         Objective:   Physical Exam:   Constitutional: She is oriented to person,  place, and time. She appears well-developed and well-nourished.      Neck: No thyromegaly present.     Pulmonary/Chest: Effort normal. Right breast exhibits no mass, no nipple discharge, no skin change, no tenderness and no bleeding. Left breast exhibits no mass, no nipple discharge, no skin change, no tenderness and no bleeding.        Abdominal: Soft. Bowel sounds are normal. She exhibits no distension and no mass. There is no abdominal tenderness. There is no rebound and no guarding.     Genitourinary:    Vagina and uterus normal.      Pelvic exam was performed with patient supine.   There is no rash, tenderness, lesion or injury on the right labia. There is no rash, tenderness, lesion or injury on the left labia. Cervix is normal. Right adnexum displays no mass, no tenderness and no fullness. Left adnexum displays no mass, no tenderness and no fullness. No erythema,  no vaginal discharge, tenderness, rectocele, cystocele or unspecified prolapse of vaginal walls in the vagina.    No signs of injury in the vagina.   Cervix exhibits no motion tenderness, no discharge and no friability. Uterus is not enlarged, not fixed and not tender.           Musculoskeletal: Normal range of motion and moves all extremeties.      Lymphadenopathy:        Right: No supraclavicular adenopathy present.        Left: No supraclavicular adenopathy present.    Neurological: She is alert and oriented to person, place, and time.    Skin: Skin is warm and dry.    Psychiatric: She has a normal mood and affect. Her behavior is normal. Judgment normal.        Assessment/ Plan:     Visit for screening mammogram  -     Mammo Digital Screening Bilat w/ Jonathan; Future; Expected date: 12/01/2022    Encounter for gynecological examination (general) (routine) without abnormal findings    Hair loss  -     TSH; Future; Expected date: 06/01/2022  -     T4, Free; Future; Expected date: 06/01/2022  -     THYROID PEROXIDASE ANTIBODY; Future; Expected date:  06/01/2022  -     Prolactin; Future; Expected date: 06/01/2022  -     Follicle Stimulating Hormone; Future  -     CBC Auto Differential; Future  -     Comprehensive Metabolic Panel; Future  -     Ferritin; Future; Expected date: 06/01/2022  -     Estradiol; Future; Expected date: 06/01/2022  -     HCG, QUANTITATIVE, PREGNANCY; Future; Expected date: 06/01/2022    Amenorrhea  -     TSH; Future; Expected date: 06/01/2022  -     T4, Free; Future; Expected date: 06/01/2022  -     THYROID PEROXIDASE ANTIBODY; Future; Expected date: 06/01/2022  -     Prolactin; Future; Expected date: 06/01/2022  -     Follicle Stimulating Hormone; Future  -     CBC Auto Differential; Future  -     Comprehensive Metabolic Panel; Future  -     Ferritin; Future; Expected date: 06/01/2022  -     Estradiol; Future; Expected date: 06/01/2022  -     HCG, QUANTITATIVE, PREGNANCY; Future; Expected date: 06/01/2022  -     US Pelvis Comp with Transvag NON-OB (xpd; Future; Expected date: 06/01/2022    Other orders  -     progesterone (PROMETRIUM) 100 MG capsule; Take 1 capsule (100 mg total) by mouth nightly.  Dispense: 30 capsule; Refill: 11         No follow-ups on file.    Patient was counseled today on A.C.S. Pap guidelines and recommendations for yearly pelvic exams, mammograms and monthly self breast exams; to see her PCP for other health maintenance.

## 2022-06-02 ENCOUNTER — PATIENT MESSAGE (OUTPATIENT)
Dept: OBSTETRICS AND GYNECOLOGY | Facility: CLINIC | Age: 42
End: 2022-06-02
Payer: COMMERCIAL

## 2022-08-03 ENCOUNTER — APPOINTMENT (OUTPATIENT)
Dept: RADIOLOGY | Facility: OTHER | Age: 42
End: 2022-08-03
Attending: INTERNAL MEDICINE
Payer: COMMERCIAL

## 2022-08-03 VITALS — BODY MASS INDEX: 19.26 KG/M2 | WEIGHT: 130.06 LBS | HEIGHT: 69 IN

## 2022-08-03 DIAGNOSIS — Z12.31 VISIT FOR SCREENING MAMMOGRAM: ICD-10-CM

## 2022-08-03 PROCEDURE — 77063 BREAST TOMOSYNTHESIS BI: CPT | Mod: 26,,, | Performed by: RADIOLOGY

## 2022-08-03 PROCEDURE — 77063 MAMMO DIGITAL SCREENING BILAT WITH TOMO: ICD-10-PCS | Mod: 26,,, | Performed by: RADIOLOGY

## 2022-08-03 PROCEDURE — 77067 SCR MAMMO BI INCL CAD: CPT | Mod: TC,PN

## 2022-08-03 PROCEDURE — 77067 MAMMO DIGITAL SCREENING BILAT WITH TOMO: ICD-10-PCS | Mod: 26,,, | Performed by: RADIOLOGY

## 2022-08-03 PROCEDURE — 77063 BREAST TOMOSYNTHESIS BI: CPT | Mod: TC,PN

## 2022-08-03 PROCEDURE — 77067 SCR MAMMO BI INCL CAD: CPT | Mod: 26,,, | Performed by: RADIOLOGY

## 2023-01-10 ENCOUNTER — TELEPHONE (OUTPATIENT)
Dept: OBSTETRICS AND GYNECOLOGY | Facility: CLINIC | Age: 43
End: 2023-01-10

## 2023-01-10 DIAGNOSIS — E07.9 THYROID DISEASE: ICD-10-CM

## 2023-01-11 RX ORDER — THYROID 30 MG/1
30 TABLET ORAL
Qty: 30 TABLET | Refills: 0 | Status: SHIPPED | OUTPATIENT
Start: 2023-01-11

## 2023-10-30 DIAGNOSIS — Z12.31 SCREENING MAMMOGRAM FOR BREAST CANCER: Primary | ICD-10-CM

## 2024-02-23 ENCOUNTER — HOSPITAL ENCOUNTER (OUTPATIENT)
Dept: RADIOLOGY | Facility: HOSPITAL | Age: 44
Discharge: HOME | End: 2024-02-23
Attending: OBSTETRICS & GYNECOLOGY
Payer: COMMERCIAL

## 2024-02-23 VITALS — WEIGHT: 130 LBS | BODY MASS INDEX: 19.26 KG/M2 | HEIGHT: 69 IN

## 2024-02-23 DIAGNOSIS — Z12.31 SCREENING MAMMOGRAM FOR BREAST CANCER: ICD-10-CM

## 2024-02-23 PROCEDURE — 77067 SCR MAMMO BI INCL CAD: CPT | Mod: TC

## 2024-02-23 PROCEDURE — 77067 SCR MAMMO BI INCL CAD: CPT | Mod: 26,,, | Performed by: RADIOLOGY

## 2024-02-23 PROCEDURE — 77063 BREAST TOMOSYNTHESIS BI: CPT | Mod: 26,,, | Performed by: RADIOLOGY

## 2024-03-12 ENCOUNTER — HOSPITAL ENCOUNTER (OUTPATIENT)
Dept: RADIOLOGY | Facility: HOSPITAL | Age: 44
Discharge: HOME | End: 2024-03-12
Attending: OBSTETRICS & GYNECOLOGY
Payer: COMMERCIAL

## 2024-03-12 DIAGNOSIS — R92.8 ABNORMAL MAMMOGRAM: ICD-10-CM

## 2024-03-12 PROCEDURE — 77065 DX MAMMO INCL CAD UNI: CPT | Mod: 26,RT,, | Performed by: RADIOLOGY

## 2024-03-12 PROCEDURE — 77061 BREAST TOMOSYNTHESIS UNI: CPT | Mod: TC,RT

## 2024-03-12 PROCEDURE — 77065 DX MAMMO INCL CAD UNI: CPT | Mod: TC,RT

## 2024-03-12 PROCEDURE — 77061 BREAST TOMOSYNTHESIS UNI: CPT | Mod: 26,RT,, | Performed by: RADIOLOGY

## 2025-07-16 DIAGNOSIS — Z12.31 SCREENING MAMMOGRAM FOR BREAST CANCER: Primary | ICD-10-CM
